# Patient Record
Sex: MALE | Race: WHITE | URBAN - METROPOLITAN AREA
[De-identification: names, ages, dates, MRNs, and addresses within clinical notes are randomized per-mention and may not be internally consistent; named-entity substitution may affect disease eponyms.]

---

## 2017-10-12 ENCOUNTER — HOSPITAL ENCOUNTER (EMERGENCY)
Facility: CLINIC | Age: 32
Discharge: PSYCHIATRIC OR SUBSTANCE USE FACILITY | End: 2017-10-13
Attending: FAMILY MEDICINE | Admitting: FAMILY MEDICINE

## 2017-10-12 VITALS
HEIGHT: 75 IN | RESPIRATION RATE: 16 BRPM | OXYGEN SATURATION: 98 % | TEMPERATURE: 98.7 F | DIASTOLIC BLOOD PRESSURE: 102 MMHG | SYSTOLIC BLOOD PRESSURE: 156 MMHG

## 2017-10-12 DIAGNOSIS — S61.512A LACERATION OF LEFT WRIST, INITIAL ENCOUNTER: ICD-10-CM

## 2017-10-12 DIAGNOSIS — R45.851 SUICIDAL IDEATION: Primary | ICD-10-CM

## 2017-10-12 LAB
ALBUMIN SERPL-MCNC: 4.1 G/DL (ref 3.4–5)
ALP SERPL-CCNC: 91 U/L (ref 40–150)
ALT SERPL W P-5'-P-CCNC: 35 U/L (ref 0–70)
AMPHETAMINES UR QL SCN: NEGATIVE
ANION GAP SERPL CALCULATED.3IONS-SCNC: 8 MMOL/L (ref 3–14)
APAP SERPL-MCNC: <2 MG/L (ref 10–20)
AST SERPL W P-5'-P-CCNC: 19 U/L (ref 0–45)
BARBITURATES UR QL: NEGATIVE
BASOPHILS # BLD AUTO: 0 10E9/L (ref 0–0.2)
BASOPHILS NFR BLD AUTO: 0.2 %
BENZODIAZ UR QL: NEGATIVE
BILIRUB SERPL-MCNC: 0.3 MG/DL (ref 0.2–1.3)
BUN SERPL-MCNC: 13 MG/DL (ref 7–30)
CALCIUM SERPL-MCNC: 9.3 MG/DL (ref 8.5–10.1)
CANNABINOIDS UR QL SCN: NEGATIVE
CHLORIDE SERPL-SCNC: 102 MMOL/L (ref 94–109)
CO2 SERPL-SCNC: 26 MMOL/L (ref 20–32)
COCAINE UR QL: NEGATIVE
CREAT SERPL-MCNC: 1.05 MG/DL (ref 0.66–1.25)
DIFFERENTIAL METHOD BLD: NORMAL
EOSINOPHIL # BLD AUTO: 0.1 10E9/L (ref 0–0.7)
EOSINOPHIL NFR BLD AUTO: 0.9 %
ERYTHROCYTE [DISTWIDTH] IN BLOOD BY AUTOMATED COUNT: 12.7 % (ref 10–15)
ETHANOL SERPL-MCNC: <0.01 G/DL
GFR SERPL CREATININE-BSD FRML MDRD: 82 ML/MIN/1.7M2
GLUCOSE SERPL-MCNC: 71 MG/DL (ref 70–99)
HCT VFR BLD AUTO: 45.9 % (ref 40–53)
HGB BLD-MCNC: 15.5 G/DL (ref 13.3–17.7)
IMM GRANULOCYTES # BLD: 0 10E9/L (ref 0–0.4)
IMM GRANULOCYTES NFR BLD: 0.1 %
LYMPHOCYTES # BLD AUTO: 1.7 10E9/L (ref 0.8–5.3)
LYMPHOCYTES NFR BLD AUTO: 18.6 %
MCH RBC QN AUTO: 27.8 PG (ref 26.5–33)
MCHC RBC AUTO-ENTMCNC: 33.8 G/DL (ref 31.5–36.5)
MCV RBC AUTO: 82 FL (ref 78–100)
MONOCYTES # BLD AUTO: 0.8 10E9/L (ref 0–1.3)
MONOCYTES NFR BLD AUTO: 8.4 %
NEUTROPHILS # BLD AUTO: 6.7 10E9/L (ref 1.6–8.3)
NEUTROPHILS NFR BLD AUTO: 71.8 %
OPIATES UR QL SCN: NEGATIVE
PCP UR QL SCN: NEGATIVE
PLATELET # BLD AUTO: 256 10E9/L (ref 150–450)
POTASSIUM SERPL-SCNC: 3.7 MMOL/L (ref 3.4–5.3)
PROT SERPL-MCNC: 8.4 G/DL (ref 6.8–8.8)
RBC # BLD AUTO: 5.57 10E12/L (ref 4.4–5.9)
SALICYLATES SERPL-MCNC: <2 MG/DL
SODIUM SERPL-SCNC: 136 MMOL/L (ref 133–144)
WBC # BLD AUTO: 9.4 10E9/L (ref 4–11)

## 2017-10-12 PROCEDURE — 90715 TDAP VACCINE 7 YRS/> IM: CPT | Performed by: PHYSICIAN ASSISTANT

## 2017-10-12 PROCEDURE — 25000132 ZZH RX MED GY IP 250 OP 250 PS 637: Performed by: FAMILY MEDICINE

## 2017-10-12 PROCEDURE — 85025 COMPLETE CBC W/AUTO DIFF WBC: CPT | Performed by: FAMILY MEDICINE

## 2017-10-12 PROCEDURE — 90471 IMMUNIZATION ADMIN: CPT

## 2017-10-12 PROCEDURE — 12002 RPR S/N/AX/GEN/TRNK2.6-7.5CM: CPT | Performed by: FAMILY MEDICINE

## 2017-10-12 PROCEDURE — 90791 PSYCH DIAGNOSTIC EVALUATION: CPT

## 2017-10-12 PROCEDURE — 80053 COMPREHEN METABOLIC PANEL: CPT | Performed by: FAMILY MEDICINE

## 2017-10-12 PROCEDURE — 80329 ANALGESICS NON-OPIOID 1 OR 2: CPT | Performed by: FAMILY MEDICINE

## 2017-10-12 PROCEDURE — 80320 DRUG SCREEN QUANTALCOHOLS: CPT | Performed by: FAMILY MEDICINE

## 2017-10-12 PROCEDURE — 25000128 H RX IP 250 OP 636: Performed by: PHYSICIAN ASSISTANT

## 2017-10-12 PROCEDURE — 12002 RPR S/N/AX/GEN/TRNK2.6-7.5CM: CPT

## 2017-10-12 PROCEDURE — 80307 DRUG TEST PRSMV CHEM ANLYZR: CPT | Performed by: FAMILY MEDICINE

## 2017-10-12 PROCEDURE — 99284 EMERGENCY DEPT VISIT MOD MDM: CPT | Mod: 25

## 2017-10-12 PROCEDURE — 99284 EMERGENCY DEPT VISIT MOD MDM: CPT | Mod: 25 | Performed by: FAMILY MEDICINE

## 2017-10-12 RX ADMIN — IBUPROFEN 600 MG: 400 TABLET ORAL at 23:43

## 2017-10-12 RX ADMIN — CLOSTRIDIUM TETANI TOXOID ANTIGEN (FORMALDEHYDE INACTIVATED), CORYNEBACTERIUM DIPHTHERIAE TOXOID ANTIGEN (FORMALDEHYDE INACTIVATED), BORDETELLA PERTUSSIS TOXOID ANTIGEN (GLUTARALDEHYDE INACTIVATED), BORDETELLA PERTUSSIS FILAMENTOUS HEMAGGLUTININ ANTIGEN (FORMALDEHYDE INACTIVATED), BORDETELLA PERTUSSIS PERTACTIN ANTIGEN, AND BORDETELLA PERTUSSIS FIMBRIAE 2/3 ANTIGEN 0.5 ML: 5; 2; 2.5; 5; 3; 5 INJECTION, SUSPENSION INTRAMUSCULAR at 18:04
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
## 2017-10-12 NOTE — ED PROVIDER NOTES
"Chief Complaint: L wrist Laceration     HPI: Chintan Ragsdale is an 32 year old male that presents to clinic after cutting self on the dorsal side of the L wrist with a fillet knife.  Patient did this intentionally.  Patient is being cared for by Dr. Beasley.   He denies numbness of the wrist or digits. He denies dysfunction of the wrist or digits.  He presented with a bunge cord wrapped around the L forearm as a tournaquet.  Ni sign of arterial bleeding was present and tournaquet was removed with little bleeding.  Patient is not up to date on tetanus.     Review of Systems:  10 point review of systems completed and negative unless included in HPI       Vitals reviewed by Zane Farias  BP (!) 185/126  Temp 99.8  F (37.7  C) (Oral)  Resp 16  Ht 1.905 m (6' 3\")  SpO2 96%    Physical Exam:  General appearance: healthy, alert and no distress  Extremities - L wrist - full range of motion of wrist and all digits.  Hand is neurologically intact.  Cap refill less than 2 seconds on all digits.  Skin: 7cm laceration covering the dorsal side of the L wrist subcutaneus in nature.   clean wound edges, no foreign bodies     Medical Decision Making:  Laceration no evidence of neurovascular injury. The wound will be closed using sutures.     Assessment:     (B68.283V) Laceration of left wrist, initial encounter     Plan:  Imaging of the injured area area for foreign body or fracture was not  indicated  Wound closed per procedure note below.    Patient given tetanus booster in clinic.    Wound was cleaned with sterile saline and surgiscrub.  Antibiotic ointment and sterile dressing applied in clinic.     Discussed home wound care and need for follow up for Suture removal in 10 days. Return to  with increased swelling, pain, redness, pus or fevers.    Patient was discharged in stable condition.  Patient verbalized understanding and agreed with this plan.      Procedure Note - Wound Repair:  Procedure performed by Keo Farias.   "   Anesthesia was obtained with 0.25% Bupivicaine via Local.  The wound, located on the Dorsal L wrist, measured 7 cm and was clean wound edges, no foreign bodies.  The level of complexity was: intermediate (layered closure or heavily contaminated) .  The neurovascular exam was normal.  It was cleaned with surgiscrub, irrigated with normal saline and explored.  The wound was closed using 4 deep 4.0 Vicryl sutures and 5-0 Ethilon for skin closure simple running.  Patient tolerated this well.  Neurological exam was normal post suture.    Zane Farias 5:42 PM       Zane Farias PA-C  10/12/17 1749

## 2017-10-12 NOTE — ED PROVIDER NOTES
"HPI  Patient is a 32-year-old male presenting with self injury.  He comes by ambulance.  He has a known history of depression and was hospitalized for suicidal ideation in the distant past.  No attempts at suicide reported.  He does take medication on a regular basis.  He denies any recent dosage changes.  He does not smoke.  Rare alcohol, none recently.  No drug use.    The patient tells me he had an argument with his stepmom which caused him to become very frustrated and angry.  As a result, he decided to cut himself on the left wrist.  He told me specifically that he did this \"has a threat to them, to scare them, to pretend like I would really want to hurt myself for real.\"  He denies being suicidal.  He denies being homicidal.  He denies any recent worsening.  He denies ingestions.  He did then cut himself on the left volar wrist.  There was much bleeding.  His father placed a tourniquet on the lower arm, just below the elbow.  He used a bungy cord to do this.    ROS: All other review of systems are negative other than that noted above.   PMH: Reviewed.  SH: Reviewed.  FH: Reviewed.      PHYSICAL  BP (!) 185/126  Temp 99.8  F (37.7  C) (Oral)  Resp 16  Ht 1.905 m (6' 3\")  SpO2 96%  General: Patient is alert and in moderate distress.  Concerned.  Neurological: Alert.  Moving upper and lower extremities equally, bilaterally.  Head / Neck: Atraumatic.  Ears: Not done.  Eyes: Pupils are equal, round, and reactive.  Normal conjunctiva.  Nose: Midline.  No epistaxis.  Mouth / Throat: No ulcerations or lesions.  Upper pharynx is not erythematous.  Moist.  Respiratory: No respiratory distress. CTA B.  Cardiovascular: Regular rhythm.  Peripheral extremities are warm.  No edema.  No calf tenderness.  Abdomen / Pelvis: Not tender.  No distention.  Soft throughout.  Genitalia: Not done.  Musculoskeletal: No tenderness over major muscles and joints.  Skin: There is a 5 cm linear laceration along the left foot were wrist. "  This is superficial.  No oozing of blood only and no pulsatile flow.  No evidence of tendon injury.  He has normal motor function of his ulnar, median, and radial nerves.      PHYSICIAN  1724.  DEC assessment will be completed.  Keo Farias, physician's assistant, will close the wound on his left wrist.  He is not suicidal or homicidal at this time.  No emergent need for work up.  I will certainly change the course if the DEC assessment suggests otherwise.    Labs Ordered and Resulted from Time of ED Arrival Up to the Time of Departure from the ED - No data to display    1816.  The patient has significant impulsivity with this episode of cutting being new and quite dramatic in its injury potential.  The DEC consultant has provided a note to justify the patient's need for hospitalization.  I will agree with this plan.  Lab values pending to help support medical stability and safety.  The patient will have transfer hold placed.  Bed placement is ongoing.      IMPRESSION    ICD-10-CM    1. Suicidal ideation R45.851    2. Laceration of left wrist, initial encounter S61.512A            Critical Care time:  none                      Cole Beasley MD  10/12/17 1814

## 2017-10-12 NOTE — ED NOTES
MD at bedside, stefanogee cord removed. Hand is returning to normal color and sensation returned immediately.